# Patient Record
Sex: MALE | Race: WHITE | NOT HISPANIC OR LATINO | URBAN - NONMETROPOLITAN AREA
[De-identification: names, ages, dates, MRNs, and addresses within clinical notes are randomized per-mention and may not be internally consistent; named-entity substitution may affect disease eponyms.]

---

## 2023-08-30 ENCOUNTER — OFFICE (OUTPATIENT)
Dept: URBAN - NONMETROPOLITAN AREA CLINIC 5 | Facility: CLINIC | Age: 73
End: 2023-08-30

## 2023-08-30 VITALS
SYSTOLIC BLOOD PRESSURE: 141 MMHG | HEIGHT: 70 IN | HEART RATE: 71 BPM | DIASTOLIC BLOOD PRESSURE: 74 MMHG | WEIGHT: 205 LBS | RESPIRATION RATE: 16 BRPM

## 2023-08-30 DIAGNOSIS — R13.10 DYSPHAGIA, UNSPECIFIED: ICD-10-CM

## 2023-08-30 DIAGNOSIS — Z80.0 FAMILY HISTORY OF MALIGNANT NEOPLASM OF DIGESTIVE ORGANS: ICD-10-CM

## 2023-08-30 PROCEDURE — 99204 OFFICE O/P NEW MOD 45 MIN: CPT | Performed by: INTERNAL MEDICINE

## 2023-08-30 NOTE — SERVICEHPINOTES
Devaughn Jain   is a   71 yo  male  with a past medical history of GERD, HTN, prostate cancer, and skin cancer who presents to establish care for dysphagia and the need for colonoscopy.  Patient reports that when he swallows sometimes it feels like food gets stuck in the middle of his chest.  This only occurs a couple times weekly.  He has never had issues with liquids.  He has not noted that it is exacerbated with certain foods.  He does feel this typically occurs with the first few bites of supper.  He denies any heartburn or acid reflux.  He remains on pantoprazole  40 mg but is taking it at bedtime.  He denies smoking or ETOH use.  He does take occasional Advil.  He does report family history of colorectal cancer in his brothers who was diagnosed in his 50s.  Patient had a colonoscopy over 9 years ago in [place].  He does report a remote history of colon polyps but his last one was normal.  He is due again at this time.

## 2023-08-30 NOTE — SERVICENOTES
Given patient's dysphagia will plan for EGD for further assessment.  Counseled to remain on his pantoprazole daily.  Have recommended he take it 30-45 minutes prior to breakfast as he is taking at bedtime.  Given family history of colorectal cancer in his brother and last colonoscopy 9 years ago will proceed with repeat colonoscopy as well.

## 2023-09-05 ENCOUNTER — AMBULATORY SURGICAL CENTER (OUTPATIENT)
Dept: URBAN - NONMETROPOLITAN AREA SURGERY 4 | Facility: SURGERY | Age: 73
End: 2023-09-05

## 2023-09-05 ENCOUNTER — AMBULATORY SURGICAL CENTER (OUTPATIENT)
Dept: URBAN - NONMETROPOLITAN AREA SURGERY 4 | Facility: SURGERY | Age: 73
End: 2023-09-05
Payer: COMMERCIAL

## 2023-09-05 VITALS
HEART RATE: 68 BPM | SYSTOLIC BLOOD PRESSURE: 118 MMHG | OXYGEN SATURATION: 100 % | HEART RATE: 78 BPM | HEIGHT: 70 IN | SYSTOLIC BLOOD PRESSURE: 150 MMHG | RESPIRATION RATE: 20 BRPM | SYSTOLIC BLOOD PRESSURE: 128 MMHG | HEART RATE: 75 BPM | SYSTOLIC BLOOD PRESSURE: 134 MMHG | RESPIRATION RATE: 14 BRPM | HEART RATE: 82 BPM | RESPIRATION RATE: 17 BRPM | TEMPERATURE: 98.7 F | RESPIRATION RATE: 14 BRPM | HEART RATE: 83 BPM | DIASTOLIC BLOOD PRESSURE: 80 MMHG | DIASTOLIC BLOOD PRESSURE: 83 MMHG | DIASTOLIC BLOOD PRESSURE: 68 MMHG | SYSTOLIC BLOOD PRESSURE: 150 MMHG | DIASTOLIC BLOOD PRESSURE: 80 MMHG | SYSTOLIC BLOOD PRESSURE: 120 MMHG | HEIGHT: 70 IN | SYSTOLIC BLOOD PRESSURE: 120 MMHG | SYSTOLIC BLOOD PRESSURE: 128 MMHG | HEART RATE: 70 BPM | HEART RATE: 75 BPM | RESPIRATION RATE: 13 BRPM | SYSTOLIC BLOOD PRESSURE: 134 MMHG | DIASTOLIC BLOOD PRESSURE: 83 MMHG | SYSTOLIC BLOOD PRESSURE: 133 MMHG | SYSTOLIC BLOOD PRESSURE: 118 MMHG | DIASTOLIC BLOOD PRESSURE: 68 MMHG | RESPIRATION RATE: 15 BRPM | HEART RATE: 70 BPM | RESPIRATION RATE: 15 BRPM | HEART RATE: 78 BPM | RESPIRATION RATE: 13 BRPM | OXYGEN SATURATION: 96 % | OXYGEN SATURATION: 100 % | OXYGEN SATURATION: 96 % | OXYGEN SATURATION: 98 % | SYSTOLIC BLOOD PRESSURE: 150 MMHG | SYSTOLIC BLOOD PRESSURE: 133 MMHG | RESPIRATION RATE: 19 BRPM | SYSTOLIC BLOOD PRESSURE: 120 MMHG | DIASTOLIC BLOOD PRESSURE: 83 MMHG | WEIGHT: 199 LBS | WEIGHT: 199 LBS | HEART RATE: 83 BPM | DIASTOLIC BLOOD PRESSURE: 75 MMHG | TEMPERATURE: 98.7 F | DIASTOLIC BLOOD PRESSURE: 75 MMHG | RESPIRATION RATE: 17 BRPM | SYSTOLIC BLOOD PRESSURE: 134 MMHG | HEART RATE: 68 BPM | RESPIRATION RATE: 20 BRPM | DIASTOLIC BLOOD PRESSURE: 80 MMHG | RESPIRATION RATE: 17 BRPM | HEART RATE: 82 BPM | SYSTOLIC BLOOD PRESSURE: 118 MMHG | OXYGEN SATURATION: 100 % | HEART RATE: 70 BPM | OXYGEN SATURATION: 98 % | OXYGEN SATURATION: 98 % | SYSTOLIC BLOOD PRESSURE: 128 MMHG | HEIGHT: 70 IN | DIASTOLIC BLOOD PRESSURE: 68 MMHG | SYSTOLIC BLOOD PRESSURE: 133 MMHG | HEART RATE: 78 BPM | RESPIRATION RATE: 15 BRPM | RESPIRATION RATE: 20 BRPM | OXYGEN SATURATION: 96 % | HEART RATE: 68 BPM | HEART RATE: 82 BPM | DIASTOLIC BLOOD PRESSURE: 75 MMHG | RESPIRATION RATE: 19 BRPM | HEART RATE: 75 BPM | RESPIRATION RATE: 14 BRPM | HEART RATE: 83 BPM | WEIGHT: 199 LBS | RESPIRATION RATE: 13 BRPM | RESPIRATION RATE: 19 BRPM | TEMPERATURE: 98.7 F

## 2023-09-05 DIAGNOSIS — R13.10 DYSPHAGIA, UNSPECIFIED: ICD-10-CM

## 2023-09-05 DIAGNOSIS — K22.89 OTHER SPECIFIED DISEASE OF ESOPHAGUS: ICD-10-CM

## 2023-09-05 DIAGNOSIS — K31.89 OTHER DISEASES OF STOMACH AND DUODENUM: ICD-10-CM

## 2023-09-05 DIAGNOSIS — Z87.891 PERSONAL HISTORY OF NICOTINE DEPENDENCE: ICD-10-CM

## 2023-09-05 DIAGNOSIS — I10 ESSENTIAL (PRIMARY) HYPERTENSION: ICD-10-CM

## 2023-09-05 DIAGNOSIS — K22.2 ESOPHAGEAL OBSTRUCTION: ICD-10-CM

## 2023-09-05 PROCEDURE — 00731 ANES UPR GI NDSC PX NOS: CPT | Mod: P3,QZ | Performed by: NURSE ANESTHETIST, CERTIFIED REGISTERED

## 2023-09-05 PROCEDURE — 43450 DILATE ESOPHAGUS 1/MULT PASS: CPT | Performed by: INTERNAL MEDICINE

## 2023-09-05 PROCEDURE — 43235 EGD DIAGNOSTIC BRUSH WASH: CPT | Performed by: INTERNAL MEDICINE

## 2023-09-05 RX ORDER — PANTOPRAZOLE SODIUM 20 MG/1
20 TABLET, DELAYED RELEASE ORAL
Qty: 0 | Refills: 0 | Status: COMPLETED
End: 2023-09-05

## 2023-09-05 RX ORDER — OMEPRAZOLE 40 MG/1
CAPSULE, DELAYED RELEASE ORAL
Qty: 30 | Refills: 11 | Status: ACTIVE
Start: 2023-09-05

## 2023-10-05 ENCOUNTER — AMBULATORY SURGICAL CENTER (OUTPATIENT)
Dept: URBAN - NONMETROPOLITAN AREA SURGERY 4 | Facility: SURGERY | Age: 73
End: 2023-10-05
Payer: COMMERCIAL

## 2023-10-05 ENCOUNTER — OFFICE (OUTPATIENT)
Dept: URBAN - METROPOLITAN AREA PATHOLOGY 16 | Facility: PATHOLOGY | Age: 73
End: 2023-10-05

## 2023-10-05 VITALS
HEART RATE: 69 BPM | OXYGEN SATURATION: 100 % | RESPIRATION RATE: 25 BRPM | HEART RATE: 72 BPM | HEART RATE: 71 BPM | DIASTOLIC BLOOD PRESSURE: 69 MMHG | OXYGEN SATURATION: 96 % | SYSTOLIC BLOOD PRESSURE: 101 MMHG | DIASTOLIC BLOOD PRESSURE: 69 MMHG | HEART RATE: 76 BPM | DIASTOLIC BLOOD PRESSURE: 63 MMHG | HEART RATE: 75 BPM | SYSTOLIC BLOOD PRESSURE: 101 MMHG | SYSTOLIC BLOOD PRESSURE: 139 MMHG | HEIGHT: 70 IN | RESPIRATION RATE: 13 BRPM | RESPIRATION RATE: 25 BRPM | RESPIRATION RATE: 22 BRPM | RESPIRATION RATE: 24 BRPM | OXYGEN SATURATION: 97 % | SYSTOLIC BLOOD PRESSURE: 90 MMHG | TEMPERATURE: 96.7 F | RESPIRATION RATE: 21 BRPM | OXYGEN SATURATION: 100 % | HEART RATE: 70 BPM | DIASTOLIC BLOOD PRESSURE: 62 MMHG | SYSTOLIC BLOOD PRESSURE: 139 MMHG | DIASTOLIC BLOOD PRESSURE: 55 MMHG | WEIGHT: 197 LBS | RESPIRATION RATE: 15 BRPM | DIASTOLIC BLOOD PRESSURE: 55 MMHG | OXYGEN SATURATION: 96 % | HEART RATE: 77 BPM | SYSTOLIC BLOOD PRESSURE: 84 MMHG | OXYGEN SATURATION: 99 % | HEART RATE: 75 BPM | OXYGEN SATURATION: 96 % | TEMPERATURE: 98 F | RESPIRATION RATE: 23 BRPM | SYSTOLIC BLOOD PRESSURE: 90 MMHG | TEMPERATURE: 97.9 F | HEART RATE: 69 BPM | SYSTOLIC BLOOD PRESSURE: 88 MMHG | SYSTOLIC BLOOD PRESSURE: 92 MMHG | HEART RATE: 71 BPM | SYSTOLIC BLOOD PRESSURE: 96 MMHG | SYSTOLIC BLOOD PRESSURE: 90 MMHG | HEART RATE: 77 BPM | OXYGEN SATURATION: 98 % | HEART RATE: 76 BPM | OXYGEN SATURATION: 99 % | OXYGEN SATURATION: 98 % | RESPIRATION RATE: 22 BRPM | RESPIRATION RATE: 21 BRPM | OXYGEN SATURATION: 97 % | DIASTOLIC BLOOD PRESSURE: 69 MMHG | RESPIRATION RATE: 15 BRPM | HEART RATE: 60 BPM | DIASTOLIC BLOOD PRESSURE: 62 MMHG | SYSTOLIC BLOOD PRESSURE: 96 MMHG | RESPIRATION RATE: 13 BRPM | DIASTOLIC BLOOD PRESSURE: 62 MMHG | DIASTOLIC BLOOD PRESSURE: 52 MMHG | HEART RATE: 72 BPM | DIASTOLIC BLOOD PRESSURE: 48 MMHG | OXYGEN SATURATION: 97 % | HEART RATE: 77 BPM | HEIGHT: 70 IN | TEMPERATURE: 97.9 F | RESPIRATION RATE: 23 BRPM | HEART RATE: 70 BPM | SYSTOLIC BLOOD PRESSURE: 88 MMHG | TEMPERATURE: 97.9 F | SYSTOLIC BLOOD PRESSURE: 135 MMHG | DIASTOLIC BLOOD PRESSURE: 48 MMHG | RESPIRATION RATE: 15 BRPM | TEMPERATURE: 96.7 F | HEART RATE: 60 BPM | SYSTOLIC BLOOD PRESSURE: 84 MMHG | HEART RATE: 72 BPM | DIASTOLIC BLOOD PRESSURE: 52 MMHG | DIASTOLIC BLOOD PRESSURE: 53 MMHG | DIASTOLIC BLOOD PRESSURE: 48 MMHG | RESPIRATION RATE: 25 BRPM | SYSTOLIC BLOOD PRESSURE: 92 MMHG | OXYGEN SATURATION: 100 % | RESPIRATION RATE: 13 BRPM | SYSTOLIC BLOOD PRESSURE: 101 MMHG | TEMPERATURE: 96.7 F | TEMPERATURE: 98 F | RESPIRATION RATE: 23 BRPM | HEART RATE: 60 BPM | HEART RATE: 71 BPM | HEART RATE: 74 BPM | RESPIRATION RATE: 24 BRPM | HEART RATE: 74 BPM | DIASTOLIC BLOOD PRESSURE: 53 MMHG | OXYGEN SATURATION: 98 % | WEIGHT: 197 LBS | HEART RATE: 75 BPM | WEIGHT: 197 LBS | SYSTOLIC BLOOD PRESSURE: 139 MMHG | DIASTOLIC BLOOD PRESSURE: 63 MMHG | OXYGEN SATURATION: 99 % | HEART RATE: 70 BPM | HEART RATE: 69 BPM | SYSTOLIC BLOOD PRESSURE: 96 MMHG | SYSTOLIC BLOOD PRESSURE: 84 MMHG | RESPIRATION RATE: 21 BRPM | SYSTOLIC BLOOD PRESSURE: 88 MMHG | SYSTOLIC BLOOD PRESSURE: 92 MMHG | DIASTOLIC BLOOD PRESSURE: 52 MMHG | HEIGHT: 70 IN | TEMPERATURE: 98 F | SYSTOLIC BLOOD PRESSURE: 135 MMHG | HEART RATE: 74 BPM | RESPIRATION RATE: 24 BRPM | RESPIRATION RATE: 22 BRPM | SYSTOLIC BLOOD PRESSURE: 135 MMHG | DIASTOLIC BLOOD PRESSURE: 63 MMHG | HEART RATE: 76 BPM | DIASTOLIC BLOOD PRESSURE: 53 MMHG | DIASTOLIC BLOOD PRESSURE: 55 MMHG

## 2023-10-05 DIAGNOSIS — Z12.11 ENCOUNTER FOR SCREENING FOR MALIGNANT NEOPLASM OF COLON: ICD-10-CM

## 2023-10-05 DIAGNOSIS — Z80.0 FAMILY HISTORY OF MALIGNANT NEOPLASM OF DIGESTIVE ORGANS: ICD-10-CM

## 2023-10-05 DIAGNOSIS — D12.3 BENIGN NEOPLASM OF TRANSVERSE COLON: ICD-10-CM

## 2023-10-05 DIAGNOSIS — D12.5 BENIGN NEOPLASM OF SIGMOID COLON: ICD-10-CM

## 2023-10-05 DIAGNOSIS — K57.30 DIVERTICULOSIS OF LARGE INTESTINE WITHOUT PERFORATION OR ABS: ICD-10-CM

## 2023-10-05 PROBLEM — K64.0 FIRST DEGREE HEMORRHOIDS: Status: ACTIVE | Noted: 2023-10-05

## 2023-10-05 PROCEDURE — 45385 COLONOSCOPY W/LESION REMOVAL: CPT | Mod: PT | Performed by: INTERNAL MEDICINE

## 2023-10-05 PROCEDURE — 00811 ANES LWR INTST NDSC NOS: CPT | Mod: PT,QZ | Performed by: NURSE ANESTHETIST, CERTIFIED REGISTERED

## 2023-10-05 PROCEDURE — 88305 TISSUE EXAM BY PATHOLOGIST: CPT | Performed by: PATHOLOGY

## 2023-10-05 PROCEDURE — 00811 ANES LWR INTST NDSC NOS: CPT | Mod: QZ,PT | Performed by: NURSE ANESTHETIST, CERTIFIED REGISTERED

## 2023-10-09 LAB
GASTRO ONE PATHOLOGY: PDF REPORT: (no result)

## 2023-12-06 ENCOUNTER — OFFICE (OUTPATIENT)
Dept: URBAN - NONMETROPOLITAN AREA CLINIC 5 | Facility: CLINIC | Age: 73
End: 2023-12-06
Payer: COMMERCIAL

## 2023-12-06 VITALS
WEIGHT: 204 LBS | DIASTOLIC BLOOD PRESSURE: 79 MMHG | RESPIRATION RATE: 18 BRPM | SYSTOLIC BLOOD PRESSURE: 132 MMHG | HEART RATE: 73 BPM | HEIGHT: 70 IN

## 2023-12-06 DIAGNOSIS — R13.10 DYSPHAGIA, UNSPECIFIED: ICD-10-CM

## 2023-12-06 DIAGNOSIS — Z86.010 PERSONAL HISTORY OF COLONIC POLYPS: ICD-10-CM

## 2023-12-06 DIAGNOSIS — K22.2 ESOPHAGEAL OBSTRUCTION: ICD-10-CM

## 2023-12-06 PROBLEM — K63.5 POLYP OF COLON: Status: ACTIVE | Noted: 2023-10-05

## 2023-12-06 PROCEDURE — 99213 OFFICE O/P EST LOW 20 MIN: CPT | Performed by: INTERNAL MEDICINE

## 2023-12-06 NOTE — SERVICENOTES
Patient reports he is doing well today. Will plan to see in clinic   As needed.  If dysphagia worsens he will let us know and will plan for repeat EGD with dilation

## 2023-12-06 NOTE — SERVICEHPINOTES
Devaughn Jain   is a   73   year old  male  with a past medical history of GERD, HTN, prostate cancer, and skin cancer who presents for follow up of dysphagia.  At his initial visit in August of 2023 reported that when he swallowed it sometimes felt like food was stuck in the middle of his chest. This was occurring several times weekly. He never had any issues liquids. He was on pantoprazole 40 mg daily but was taking it at bedtime. Patient also reported a family history of colorectal cancer in his brothers who were diagnosed in their 50s. Patient had last had a colonoscopy 9 years ago in [place]..
br
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 There was mild erythema in the antrum compatible with gastritis and normal-appearing mucosa of the duodenum. Patient was started on omeprazole daily and his pantoprazole was discontinued. He also had a colonoscopy on 10/05/2023 which noted a 5 mm polyp in the hepatic flexure as well as a semi pedunculated 1 cm polyp in the sigmoid colon. Polyps removed returned as tubular adenomas and a 3 year recall was recommended. Patient presents follow-up today. He reports he is doing well. His dysphagia has resolved since the dilation. He has no complaints today.